# Patient Record
Sex: MALE | URBAN - METROPOLITAN AREA
[De-identification: names, ages, dates, MRNs, and addresses within clinical notes are randomized per-mention and may not be internally consistent; named-entity substitution may affect disease eponyms.]

---

## 2019-12-20 ENCOUNTER — OFFICE VISIT (OUTPATIENT)
Dept: URBAN - METROPOLITAN AREA CLINIC 33 | Facility: CLINIC | Age: 59
End: 2019-12-20
Payer: COMMERCIAL

## 2019-12-20 DIAGNOSIS — H25.13 AGE-RELATED NUCLEAR CATARACT, BILATERAL: ICD-10-CM

## 2019-12-20 PROCEDURE — 92004 COMPRE OPH EXAM NEW PT 1/>: CPT | Performed by: OPTOMETRIST

## 2019-12-20 ASSESSMENT — INTRAOCULAR PRESSURE
OD: 14
OS: 13

## 2019-12-20 ASSESSMENT — VISUAL ACUITY
OS: 20/20
OD: 20/20

## 2020-12-23 ENCOUNTER — OFFICE VISIT (OUTPATIENT)
Dept: URBAN - METROPOLITAN AREA CLINIC 25 | Facility: CLINIC | Age: 60
End: 2020-12-23
Payer: COMMERCIAL

## 2020-12-23 PROCEDURE — 92014 COMPRE OPH EXAM EST PT 1/>: CPT | Performed by: OPTOMETRIST

## 2020-12-23 ASSESSMENT — INTRAOCULAR PRESSURE
OD: 16
OS: 12

## 2020-12-23 ASSESSMENT — VISUAL ACUITY
OD: 20/20
OS: 20/20

## 2021-12-27 ENCOUNTER — OFFICE VISIT (OUTPATIENT)
Dept: URBAN - METROPOLITAN AREA CLINIC 28 | Facility: CLINIC | Age: 61
End: 2021-12-27
Payer: COMMERCIAL

## 2021-12-27 DIAGNOSIS — H04.121 DRY EYE SYNDROME OF RIGHT LACRIMAL GLAND: ICD-10-CM

## 2021-12-27 DIAGNOSIS — H52.4 PRESBYOPIA: Primary | ICD-10-CM

## 2021-12-27 PROCEDURE — 92012 INTRM OPH EXAM EST PATIENT: CPT | Performed by: OPTOMETRIST

## 2021-12-27 ASSESSMENT — VISUAL ACUITY
OS: 20/20
OD: 20/20

## 2021-12-27 NOTE — IMPRESSION/PLAN
Impression: Presbyopia: H52.4. Plan: Discussed diagnosis in detail with patient. No treatment is required at this time. Recommended OTC Readers. Will continue to observe condition and or symptoms. Reassured patient of current condition and treatment. Call if Symptoms occur.

## 2021-12-27 NOTE — IMPRESSION/PLAN
Impression: Dry eye syndrome of right lacrimal gland: H04.121. Plan: Discussed diagnosis in detail with patient. Discussed treatment options with patient. Patient instructed to use artificial tears as instructed. Use ointment at night as instructed. Will continue to observe condition and or symptoms. Patient instructed to call if condition gets worse.

## 2022-12-27 ENCOUNTER — OFFICE VISIT (OUTPATIENT)
Dept: URBAN - METROPOLITAN AREA CLINIC 28 | Facility: CLINIC | Age: 62
End: 2022-12-27
Payer: COMMERCIAL

## 2022-12-27 DIAGNOSIS — H52.4 PRESBYOPIA: Primary | ICD-10-CM

## 2022-12-27 PROCEDURE — 92012 INTRM OPH EXAM EST PATIENT: CPT | Performed by: OPTOMETRIST

## 2022-12-27 ASSESSMENT — VISUAL ACUITY
OD: 20/20
OS: 20/20

## 2023-12-27 ENCOUNTER — OFFICE VISIT (OUTPATIENT)
Dept: URBAN - METROPOLITAN AREA CLINIC 28 | Facility: CLINIC | Age: 63
End: 2023-12-27
Payer: COMMERCIAL

## 2023-12-27 DIAGNOSIS — H52.4 PRESBYOPIA: Primary | ICD-10-CM

## 2023-12-27 PROCEDURE — 92012 INTRM OPH EXAM EST PATIENT: CPT | Performed by: OPTOMETRIST

## 2023-12-27 ASSESSMENT — KERATOMETRY
OD: 44.50
OS: 105.88

## 2023-12-27 ASSESSMENT — INTRAOCULAR PRESSURE
OD: 16
OS: 14

## 2023-12-27 ASSESSMENT — VISUAL ACUITY
OD: 20/20
OS: 20/20

## 2024-09-16 ENCOUNTER — OFFICE VISIT (OUTPATIENT)
Dept: URBAN - METROPOLITAN AREA CLINIC 28 | Facility: CLINIC | Age: 64
End: 2024-09-16
Payer: OTHER GOVERNMENT

## 2024-09-16 DIAGNOSIS — H04.121 DRY EYE SYNDROME OF RIGHT LACRIMAL GLAND: Primary | ICD-10-CM

## 2024-09-16 PROCEDURE — 99213 OFFICE O/P EST LOW 20 MIN: CPT | Performed by: OPTOMETRIST
